# Patient Record
Sex: FEMALE | Race: OTHER | HISPANIC OR LATINO
[De-identification: names, ages, dates, MRNs, and addresses within clinical notes are randomized per-mention and may not be internally consistent; named-entity substitution may affect disease eponyms.]

---

## 2018-12-21 ENCOUNTER — TRANSCRIPTION ENCOUNTER (OUTPATIENT)
Age: 43
End: 2018-12-21

## 2019-07-19 ENCOUNTER — EMERGENCY (EMERGENCY)
Facility: HOSPITAL | Age: 44
LOS: 0 days | Discharge: HOME | End: 2019-07-19
Attending: EMERGENCY MEDICINE | Admitting: EMERGENCY MEDICINE
Payer: COMMERCIAL

## 2019-07-19 VITALS
SYSTOLIC BLOOD PRESSURE: 117 MMHG | DIASTOLIC BLOOD PRESSURE: 91 MMHG | HEART RATE: 70 BPM | RESPIRATION RATE: 18 BRPM | TEMPERATURE: 99 F | OXYGEN SATURATION: 100 %

## 2019-07-19 VITALS — WEIGHT: 145.06 LBS | HEIGHT: 64 IN

## 2019-07-19 DIAGNOSIS — R07.9 CHEST PAIN, UNSPECIFIED: ICD-10-CM

## 2019-07-19 DIAGNOSIS — R10.9 UNSPECIFIED ABDOMINAL PAIN: ICD-10-CM

## 2019-07-19 DIAGNOSIS — K29.60 OTHER GASTRITIS WITHOUT BLEEDING: ICD-10-CM

## 2019-07-19 LAB
ALBUMIN SERPL ELPH-MCNC: 3.7 G/DL — SIGNIFICANT CHANGE UP (ref 3.3–5)
ALP SERPL-CCNC: 75 U/L — SIGNIFICANT CHANGE UP (ref 40–120)
ALT FLD-CCNC: 24 U/L — SIGNIFICANT CHANGE UP (ref 12–78)
ANION GAP SERPL CALC-SCNC: 7 MMOL/L — SIGNIFICANT CHANGE UP (ref 5–17)
AST SERPL-CCNC: 8 U/L — LOW (ref 15–37)
BASOPHILS # BLD AUTO: 0.04 K/UL — SIGNIFICANT CHANGE UP (ref 0–0.2)
BASOPHILS NFR BLD AUTO: 0.5 % — SIGNIFICANT CHANGE UP (ref 0–2)
BILIRUB SERPL-MCNC: 0.4 MG/DL — SIGNIFICANT CHANGE UP (ref 0.2–1.2)
BUN SERPL-MCNC: 8 MG/DL — SIGNIFICANT CHANGE UP (ref 7–23)
CALCIUM SERPL-MCNC: 8.9 MG/DL — SIGNIFICANT CHANGE UP (ref 8.5–10.1)
CHLORIDE SERPL-SCNC: 108 MMOL/L — SIGNIFICANT CHANGE UP (ref 96–108)
CO2 SERPL-SCNC: 25 MMOL/L — SIGNIFICANT CHANGE UP (ref 22–31)
CREAT SERPL-MCNC: 0.75 MG/DL — SIGNIFICANT CHANGE UP (ref 0.5–1.3)
EOSINOPHIL # BLD AUTO: 0.12 K/UL — SIGNIFICANT CHANGE UP (ref 0–0.5)
EOSINOPHIL NFR BLD AUTO: 1.4 % — SIGNIFICANT CHANGE UP (ref 0–6)
GLUCOSE SERPL-MCNC: 87 MG/DL — SIGNIFICANT CHANGE UP (ref 70–99)
HCT VFR BLD CALC: 43.9 % — SIGNIFICANT CHANGE UP (ref 34.5–45)
HGB BLD-MCNC: 14.4 G/DL — SIGNIFICANT CHANGE UP (ref 11.5–15.5)
IMM GRANULOCYTES NFR BLD AUTO: 0.2 % — SIGNIFICANT CHANGE UP (ref 0–1.5)
LIDOCAIN IGE QN: 135 U/L — SIGNIFICANT CHANGE UP (ref 73–393)
LYMPHOCYTES # BLD AUTO: 1.86 K/UL — SIGNIFICANT CHANGE UP (ref 1–3.3)
LYMPHOCYTES # BLD AUTO: 21.4 % — SIGNIFICANT CHANGE UP (ref 13–44)
MCHC RBC-ENTMCNC: 28.9 PG — SIGNIFICANT CHANGE UP (ref 27–34)
MCHC RBC-ENTMCNC: 32.8 GM/DL — SIGNIFICANT CHANGE UP (ref 32–36)
MCV RBC AUTO: 88 FL — SIGNIFICANT CHANGE UP (ref 80–100)
MONOCYTES # BLD AUTO: 0.54 K/UL — SIGNIFICANT CHANGE UP (ref 0–0.9)
MONOCYTES NFR BLD AUTO: 6.2 % — SIGNIFICANT CHANGE UP (ref 2–14)
NEUTROPHILS # BLD AUTO: 6.1 K/UL — SIGNIFICANT CHANGE UP (ref 1.8–7.4)
NEUTROPHILS NFR BLD AUTO: 70.3 % — SIGNIFICANT CHANGE UP (ref 43–77)
PLATELET # BLD AUTO: 256 K/UL — SIGNIFICANT CHANGE UP (ref 150–400)
POTASSIUM SERPL-MCNC: 3.7 MMOL/L — SIGNIFICANT CHANGE UP (ref 3.5–5.3)
POTASSIUM SERPL-SCNC: 3.7 MMOL/L — SIGNIFICANT CHANGE UP (ref 3.5–5.3)
PROT SERPL-MCNC: 7.9 GM/DL — SIGNIFICANT CHANGE UP (ref 6–8.3)
RBC # BLD: 4.99 M/UL — SIGNIFICANT CHANGE UP (ref 3.8–5.2)
RBC # FLD: 12.5 % — SIGNIFICANT CHANGE UP (ref 10.3–14.5)
SODIUM SERPL-SCNC: 140 MMOL/L — SIGNIFICANT CHANGE UP (ref 135–145)
WBC # BLD: 8.68 K/UL — SIGNIFICANT CHANGE UP (ref 3.8–10.5)
WBC # FLD AUTO: 8.68 K/UL — SIGNIFICANT CHANGE UP (ref 3.8–10.5)

## 2019-07-19 PROCEDURE — 99284 EMERGENCY DEPT VISIT MOD MDM: CPT

## 2019-07-19 PROCEDURE — 93010 ELECTROCARDIOGRAM REPORT: CPT

## 2019-07-19 RX ORDER — LIDOCAINE 4 G/100G
5 CREAM TOPICAL ONCE
Refills: 0 | Status: COMPLETED | OUTPATIENT
Start: 2019-07-19 | End: 2019-07-19

## 2019-07-19 RX ORDER — FAMOTIDINE 10 MG/ML
20 INJECTION INTRAVENOUS ONCE
Refills: 0 | Status: COMPLETED | OUTPATIENT
Start: 2019-07-19 | End: 2019-07-19

## 2019-07-19 RX ADMIN — FAMOTIDINE 20 MILLIGRAM(S): 10 INJECTION INTRAVENOUS at 14:59

## 2019-07-19 RX ADMIN — LIDOCAINE 5 MILLILITER(S): 4 CREAM TOPICAL at 14:59

## 2019-07-19 RX ADMIN — Medication 30 MILLILITER(S): at 14:59

## 2019-07-19 NOTE — ED ADULT NURSE NOTE - NSIMPLEMENTINTERV_GEN_ALL_ED
Implemented All Universal Safety Interventions:  [place] to call system. Call bell, personal items and telephone within reach. Instruct patient to call for assistance. Room bathroom lighting operational. Non-slip footwear when patient is off stretcher. Physically safe environment: no spills, clutter or unnecessary equipment. Stretcher in lowest position, wheels locked, appropriate side rails in place.

## 2019-07-19 NOTE — ED STATDOCS - OBJECTIVE STATEMENT
43 y/o F with PMHx of colitis presenting to the ED c/o CP with radiation with radiation down R arm beginning 3 days ago. Pt states symptoms have been worsening today prompting ED visit. Pt also notes a burning sensation in her abd. Pt saw MD for stomach pain and was placed on rx with little to no relief. Denies fever, chills, SOB, or N/V/D. PMD: Dr. Ricketts.  #: [id number]. NKDA.

## 2019-07-19 NOTE — ED ADULT NURSE NOTE - OBJECTIVE STATEMENT
Patient complaining of epigastric discomfort. patient reports feeling a sour sensation up the throat.

## 2019-07-19 NOTE — ED STATDOCS - CLINICAL SUMMARY MEDICAL DECISION MAKING FREE TEXT BOX
Labs unremarkable.  Pt. had H Pylori test from PMD and will follow for GI referral.  all questions answered.  Pt. provided aftercare instructions.  copies of labs provided.   # [id number]  Florence Gibson PA-C

## 2019-07-21 LAB — H PYLORI AB SER-ACNC: 19.9 UNITS — SIGNIFICANT CHANGE UP

## 2019-08-01 ENCOUNTER — EMERGENCY (EMERGENCY)
Facility: HOSPITAL | Age: 44
LOS: 0 days | Discharge: HOME | End: 2019-08-01
Attending: EMERGENCY MEDICINE | Admitting: EMERGENCY MEDICINE
Payer: COMMERCIAL

## 2019-08-01 VITALS
OXYGEN SATURATION: 100 % | DIASTOLIC BLOOD PRESSURE: 84 MMHG | TEMPERATURE: 98 F | RESPIRATION RATE: 17 BRPM | SYSTOLIC BLOOD PRESSURE: 112 MMHG | HEART RATE: 67 BPM

## 2019-08-01 VITALS — WEIGHT: 119.93 LBS | HEIGHT: 63 IN

## 2019-08-01 DIAGNOSIS — R10.13 EPIGASTRIC PAIN: ICD-10-CM

## 2019-08-01 DIAGNOSIS — K29.70 GASTRITIS, UNSPECIFIED, WITHOUT BLEEDING: ICD-10-CM

## 2019-08-01 LAB
ALBUMIN SERPL ELPH-MCNC: 3.9 G/DL — SIGNIFICANT CHANGE UP (ref 3.3–5)
ALP SERPL-CCNC: 70 U/L — SIGNIFICANT CHANGE UP (ref 40–120)
ALT FLD-CCNC: 25 U/L — SIGNIFICANT CHANGE UP (ref 12–78)
ANION GAP SERPL CALC-SCNC: 7 MMOL/L — SIGNIFICANT CHANGE UP (ref 5–17)
APPEARANCE UR: CLEAR — SIGNIFICANT CHANGE UP
AST SERPL-CCNC: 13 U/L — LOW (ref 15–37)
BASOPHILS # BLD AUTO: 0.04 K/UL — SIGNIFICANT CHANGE UP (ref 0–0.2)
BASOPHILS NFR BLD AUTO: 0.5 % — SIGNIFICANT CHANGE UP (ref 0–2)
BILIRUB SERPL-MCNC: 0.3 MG/DL — SIGNIFICANT CHANGE UP (ref 0.2–1.2)
BILIRUB UR-MCNC: NEGATIVE — SIGNIFICANT CHANGE UP
BUN SERPL-MCNC: 7 MG/DL — SIGNIFICANT CHANGE UP (ref 7–23)
CALCIUM SERPL-MCNC: 8.6 MG/DL — SIGNIFICANT CHANGE UP (ref 8.5–10.1)
CHLORIDE SERPL-SCNC: 111 MMOL/L — HIGH (ref 96–108)
CO2 SERPL-SCNC: 24 MMOL/L — SIGNIFICANT CHANGE UP (ref 22–31)
COLOR SPEC: YELLOW — SIGNIFICANT CHANGE UP
CREAT SERPL-MCNC: 0.76 MG/DL — SIGNIFICANT CHANGE UP (ref 0.5–1.3)
DIFF PNL FLD: ABNORMAL
EOSINOPHIL # BLD AUTO: 0.04 K/UL — SIGNIFICANT CHANGE UP (ref 0–0.5)
EOSINOPHIL NFR BLD AUTO: 0.5 % — SIGNIFICANT CHANGE UP (ref 0–6)
GLUCOSE SERPL-MCNC: 91 MG/DL — SIGNIFICANT CHANGE UP (ref 70–99)
GLUCOSE UR QL: NEGATIVE MG/DL — SIGNIFICANT CHANGE UP
HCT VFR BLD CALC: 42.4 % — SIGNIFICANT CHANGE UP (ref 34.5–45)
HGB BLD-MCNC: 14 G/DL — SIGNIFICANT CHANGE UP (ref 11.5–15.5)
IMM GRANULOCYTES NFR BLD AUTO: 0.3 % — SIGNIFICANT CHANGE UP (ref 0–1.5)
KETONES UR-MCNC: ABNORMAL
LEUKOCYTE ESTERASE UR-ACNC: ABNORMAL
LIDOCAIN IGE QN: 80 U/L — SIGNIFICANT CHANGE UP (ref 73–393)
LYMPHOCYTES # BLD AUTO: 1.56 K/UL — SIGNIFICANT CHANGE UP (ref 1–3.3)
LYMPHOCYTES # BLD AUTO: 19.5 % — SIGNIFICANT CHANGE UP (ref 13–44)
MCHC RBC-ENTMCNC: 28.9 PG — SIGNIFICANT CHANGE UP (ref 27–34)
MCHC RBC-ENTMCNC: 33 GM/DL — SIGNIFICANT CHANGE UP (ref 32–36)
MCV RBC AUTO: 87.4 FL — SIGNIFICANT CHANGE UP (ref 80–100)
MONOCYTES # BLD AUTO: 0.28 K/UL — SIGNIFICANT CHANGE UP (ref 0–0.9)
MONOCYTES NFR BLD AUTO: 3.5 % — SIGNIFICANT CHANGE UP (ref 2–14)
NEUTROPHILS # BLD AUTO: 6.06 K/UL — SIGNIFICANT CHANGE UP (ref 1.8–7.4)
NEUTROPHILS NFR BLD AUTO: 75.7 % — SIGNIFICANT CHANGE UP (ref 43–77)
NITRITE UR-MCNC: NEGATIVE — SIGNIFICANT CHANGE UP
PH UR: 5 — SIGNIFICANT CHANGE UP (ref 5–8)
PLATELET # BLD AUTO: 257 K/UL — SIGNIFICANT CHANGE UP (ref 150–400)
POTASSIUM SERPL-MCNC: 3.7 MMOL/L — SIGNIFICANT CHANGE UP (ref 3.5–5.3)
POTASSIUM SERPL-SCNC: 3.7 MMOL/L — SIGNIFICANT CHANGE UP (ref 3.5–5.3)
PROT SERPL-MCNC: 7.6 GM/DL — SIGNIFICANT CHANGE UP (ref 6–8.3)
PROT UR-MCNC: 15 MG/DL
RBC # BLD: 4.85 M/UL — SIGNIFICANT CHANGE UP (ref 3.8–5.2)
RBC # FLD: 12.3 % — SIGNIFICANT CHANGE UP (ref 10.3–14.5)
SODIUM SERPL-SCNC: 142 MMOL/L — SIGNIFICANT CHANGE UP (ref 135–145)
SP GR SPEC: 1.02 — SIGNIFICANT CHANGE UP (ref 1.01–1.02)
UROBILINOGEN FLD QL: NEGATIVE MG/DL — SIGNIFICANT CHANGE UP
WBC # BLD: 8 K/UL — SIGNIFICANT CHANGE UP (ref 3.8–10.5)
WBC # FLD AUTO: 8 K/UL — SIGNIFICANT CHANGE UP (ref 3.8–10.5)

## 2019-08-01 PROCEDURE — 93010 ELECTROCARDIOGRAM REPORT: CPT

## 2019-08-01 PROCEDURE — 99284 EMERGENCY DEPT VISIT MOD MDM: CPT

## 2019-08-01 RX ORDER — SUCRALFATE 1 G
10 TABLET ORAL
Qty: 200 | Refills: 0
Start: 2019-08-01

## 2019-08-01 RX ORDER — FAMOTIDINE 10 MG/ML
20 INJECTION INTRAVENOUS ONCE
Refills: 0 | Status: COMPLETED | OUTPATIENT
Start: 2019-08-01 | End: 2019-08-01

## 2019-08-01 RX ORDER — ONDANSETRON 8 MG/1
1 TABLET, FILM COATED ORAL
Qty: 10 | Refills: 0
Start: 2019-08-01

## 2019-08-01 RX ORDER — SUCRALFATE 1 G
1 TABLET ORAL ONCE
Refills: 0 | Status: COMPLETED | OUTPATIENT
Start: 2019-08-01 | End: 2019-08-01

## 2019-08-01 RX ADMIN — Medication 1 GRAM(S): at 12:39

## 2019-08-01 RX ADMIN — FAMOTIDINE 20 MILLIGRAM(S): 10 INJECTION INTRAVENOUS at 12:39

## 2019-08-01 NOTE — ED ADULT TRIAGE NOTE - CHIEF COMPLAINT QUOTE
pt c/o epigastric burning radiating to throat  and nausea x 3 months , pt awaiting Dr. Khan( GI) for consultation for endoscopy , MD is on vacation , Pt reports 15 lbs weight loss in 2 weeks , pt takes omeprazole and pepcid with no relief

## 2019-08-01 NOTE — ED ADULT NURSE NOTE - OBJECTIVE STATEMENT
pt was experiencing nausea and abd discomfort. currently does not have either symptom. resting comfortably at this time

## 2019-08-01 NOTE — ED STATDOCS - CLINICAL SUMMARY MEDICAL DECISION MAKING FREE TEXT BOX
most likely GI complaint has f/u with GI started on proton pump inhibitor will get labs and f/u with GI.

## 2019-08-01 NOTE — ED STATDOCS - PROGRESS NOTE DETAILS
signed Patricia Chisholm PA-C Pt seen initially in intake by Dr. Alonso   44F c/o epigastric pain, burning x 3 mos. Pt has appt with GI Dr Khan in 1 month for scope. Pt notes it has been difficult to eat and she has been losing weight. pt alert, NAD. Plan labs, pepcid, carafate. Pt agrees with plan of  care. signed Patricia Chisholm PA-C  ID [id number] signed Patricia Chisholm PA-C  ID [id number]  No significant findings on labwork. Mod ketones in ED. Pt feeling better after meds. Likely gastritis. Will give other GI names for closer f/u. Will send rx for carafate and zofran. Pt given copy of lab results. Pt feeling well at DC, agrees with DC and plan of care.

## 2019-08-01 NOTE — ED STATDOCS - OBJECTIVE STATEMENT
45 y/o female with a PMHx of Colitis, presents to the ED c/o epigastric burning radiating to throat and nausea x3 months. +vomiting and loss of appetite. States it is difficult to swallow. Is currently being treated for GERD. Pt is awaiting Dr. Khan for consultation for endoscopy. States she takes Omeprazole and Pepcid with no relief.  PCP: Dr. Ricketts. GI: Dr. Khan. 45 y/o female with a PMHx of Colitis, presents to the ED c/o epigastric burning radiating to throat and nausea x3 months. +vomiting. States it is difficult to swallow. Is currently being treated for GERD. Pt is awaiting Dr. Khan for consultation for endoscopy. States she takes Omeprazole and Pepcid with no relief.  PCP: Dr. Ricketts. GI: Dr. Khan.

## 2019-08-01 NOTE — ED ADULT NURSE NOTE - NSIMPLEMENTINTERV_GEN_ALL_ED
Implemented All Universal Safety Interventions:  [place] to call system. Call bell, personal items and telephone within reach. Instruct patient to call for assistance. Room bathroom lighting operational. Non-slip footwear when patient is off stretcher. Physically safe environment: no spills, clutter or unnecessary equipment. Stretcher in lowest position, wheels locked, appropriate side rails in place.

## 2019-08-08 ENCOUNTER — OUTPATIENT (OUTPATIENT)
Dept: OUTPATIENT SERVICES | Facility: HOSPITAL | Age: 44
LOS: 1 days | Discharge: HOME | End: 2019-08-08
Payer: COMMERCIAL

## 2019-08-08 VITALS
RESPIRATION RATE: 20 BRPM | HEART RATE: 70 BPM | DIASTOLIC BLOOD PRESSURE: 81 MMHG | HEIGHT: 61 IN | TEMPERATURE: 97 F | WEIGHT: 140.65 LBS | SYSTOLIC BLOOD PRESSURE: 113 MMHG | OXYGEN SATURATION: 100 %

## 2019-08-08 DIAGNOSIS — R10.13 EPIGASTRIC PAIN: ICD-10-CM

## 2019-08-08 DIAGNOSIS — Z98.891 HISTORY OF UTERINE SCAR FROM PREVIOUS SURGERY: Chronic | ICD-10-CM

## 2019-08-08 LAB — HCG UR QL: NEGATIVE — SIGNIFICANT CHANGE UP

## 2019-08-08 PROCEDURE — 88305 TISSUE EXAM BY PATHOLOGIST: CPT

## 2019-08-08 PROCEDURE — 88312 SPECIAL STAINS GROUP 1: CPT | Mod: 26

## 2019-08-08 PROCEDURE — 88313 SPECIAL STAINS GROUP 2: CPT | Mod: 26

## 2019-08-08 PROCEDURE — 88312 SPECIAL STAINS GROUP 1: CPT

## 2019-08-08 PROCEDURE — 88305 TISSUE EXAM BY PATHOLOGIST: CPT | Mod: 26

## 2019-08-08 PROCEDURE — 81025 URINE PREGNANCY TEST: CPT

## 2019-08-08 PROCEDURE — 88313 SPECIAL STAINS GROUP 2: CPT

## 2019-08-14 DIAGNOSIS — K29.00 ACUTE GASTRITIS WITHOUT BLEEDING: ICD-10-CM

## 2019-08-14 DIAGNOSIS — K21.9 GASTRO-ESOPHAGEAL REFLUX DISEASE WITHOUT ESOPHAGITIS: ICD-10-CM

## 2019-08-14 DIAGNOSIS — R13.10 DYSPHAGIA, UNSPECIFIED: ICD-10-CM

## 2020-05-08 NOTE — ED STATDOCS - DATE/TIME 1
Consent: The patient's consent was obtained including but not limited to risks of crusting, scabbing, blistering, scarring, darker or lighter pigmentary change, recurrence, incomplete removal and infection. Pared With?: 15 blade Add 52 Modifier (Optional): no Duration Of Freeze Thaw-Cycle (Seconds): 5 Medical Necessity Information: It is in your best interest to select a reason for this procedure from the list below. All of these items fulfill various CMS LCD requirements except the new and changing color options. Detail Level: Detailed Number Of Freeze-Thaw Cycles: 1 freeze-thaw cycle Include Z78.9 (Other Specified Conditions Influencing Health Status) As An Associated Diagnosis?: Yes Post-Care Instructions: I reviewed with the patient in detail post-care instructions. Patient is to wear sunprotection, and avoid picking at any of the treated lesions. Pt may apply Vaseline to crusted or scabbing areas. Post-Care Instructions: I reviewed with the patient in detail post-care instructions. Patient is to wear sunprotection, and avoid picking at any of the treated lesions. Pt may apply polysporin to crusted or scabbing areas. Number Of Freeze-Thaw Cycles: 2 freeze-thaw cycles Duration Of Freeze Thaw-Cycle (Seconds): 5-10 01-Aug-2019 12:33

## 2020-10-29 ENCOUNTER — TRANSCRIPTION ENCOUNTER (OUTPATIENT)
Age: 45
End: 2020-10-29

## 2021-09-01 NOTE — ASU PATIENT PROFILE, ADULT - NS PRO PT RIGHT SUPPORT PERSON
Received a fax from [employer]  Needed a Medicare Detailed Written Order filled out  Filled out and faxed back  Declines

## 2022-01-09 ENCOUNTER — TRANSCRIPTION ENCOUNTER (OUTPATIENT)
Age: 47
End: 2022-01-09

## 2022-05-28 NOTE — ED STATDOCS - ENMT, MLM
Nasal mucosa clear.  Mouth with normal mucosa  Throat has no vesicles, no oropharyngeal exudates and uvula is midline. [[id number]]

## 2023-05-01 ENCOUNTER — NON-APPOINTMENT (OUTPATIENT)
Age: 48
End: 2023-05-01

## 2023-06-28 ENCOUNTER — NON-APPOINTMENT (OUTPATIENT)
Age: 48
End: 2023-06-28

## 2023-06-28 PROBLEM — Z78.9 OTHER SPECIFIED HEALTH STATUS: Chronic | Status: ACTIVE | Noted: 2019-08-08

## 2023-07-10 ENCOUNTER — APPOINTMENT (OUTPATIENT)
Dept: ORTHOPEDIC SURGERY | Facility: CLINIC | Age: 48
End: 2023-07-10

## 2023-07-10 PROBLEM — Z00.00 ENCOUNTER FOR PREVENTIVE HEALTH EXAMINATION: Status: ACTIVE | Noted: 2023-07-10

## 2024-01-08 ENCOUNTER — TRANSCRIPTION ENCOUNTER (OUTPATIENT)
Age: 49
End: 2024-01-08

## 2024-01-08 ENCOUNTER — APPOINTMENT (OUTPATIENT)
Dept: ORTHOPEDIC SURGERY | Facility: CLINIC | Age: 49
End: 2024-01-08
Payer: COMMERCIAL

## 2024-01-08 VITALS
SYSTOLIC BLOOD PRESSURE: 147 MMHG | DIASTOLIC BLOOD PRESSURE: 90 MMHG | WEIGHT: 160 LBS | BODY MASS INDEX: 28.35 KG/M2 | HEIGHT: 63 IN

## 2024-01-08 PROCEDURE — 99204 OFFICE O/P NEW MOD 45 MIN: CPT

## 2024-01-08 PROCEDURE — 72110 X-RAY EXAM L-2 SPINE 4/>VWS: CPT

## 2024-01-08 RX ORDER — GABAPENTIN 300 MG/1
300 CAPSULE ORAL 3 TIMES DAILY
Qty: 90 | Refills: 1 | Status: ACTIVE | COMMUNITY
Start: 2024-01-08 | End: 1900-01-01

## 2024-01-08 RX ORDER — METHYLPREDNISOLONE 4 MG/1
4 TABLET ORAL
Qty: 1 | Refills: 0 | Status: ACTIVE | COMMUNITY
Start: 2024-01-08 | End: 1900-01-01

## 2024-02-05 ENCOUNTER — APPOINTMENT (OUTPATIENT)
Dept: ORTHOPEDIC SURGERY | Facility: CLINIC | Age: 49
End: 2024-02-05
Payer: COMMERCIAL

## 2024-02-05 VITALS — BODY MASS INDEX: 28.35 KG/M2 | WEIGHT: 160 LBS | HEIGHT: 63 IN

## 2024-02-05 PROCEDURE — 99214 OFFICE O/P EST MOD 30 MIN: CPT

## 2024-02-05 NOTE — DISCUSSION/SUMMARY
[de-identified] : MRI lumbar spine to evaluate for a left S1 radiculopathy She will continue her gabapentin 300 mg 3 times a day We will try diclofenac 75 mg twice daily with food for pain control I will see her back after the MRI to go over the results and possibly recommend injections moving forward.

## 2024-02-05 NOTE — HISTORY OF PRESENT ILLNESS
[de-identified] : Chief Complaint: Left leg pain   History of Present Illness: 48-year-old female presenting today for a 1 month follow-up visit, [place]inamese-speaking,  was used throughout the entirety of her exam today.  During her last visit she was diagnosed with a left S1 radiculopathy she has been suffering with this for about 2 to 3 months.  We increased her gabapentin, started a Medrol Dosepak which she states gave her significant relief while on the Medrol Dosepak but all the pain came back.  She states the pain today is a 10 out of 10 in severity causing her significant discomfort decreased quality of life and inability to perform her activities of daily living such as working.  She was taking Celebrex twice a day and states that this also did not help with her symptoms.  She has severe left leg pain she denies any weakness she denies any numbness or tingling.  She is overall unhappy with her current management unhappy with her current level of pain control and would like to consider other options moving forward.   Past medical history, past surgical history, medications, allergies, social history, and family history are as documented in our records today.  Notable items include: None   Review of Systems: I have reviewed the patient's documented Review of Systems data today, I concur with this documentation.

## 2024-02-05 NOTE — REASON FOR VISIT
[Follow-Up Visit] : a follow-up visit for [Back Pain] : back pain [[employer] ] : provided by [employer]   [Interpreters_IDNumber] : [id number] [Interpreters_FullName] : Courtney [TWNoteComboBox1] : [place]araguan

## 2024-02-05 NOTE — ASSESSMENT
[FreeTextEntry1] : The patient and I discussed the use of non-steroidal anti-inflammatory drugs (NSAIDs) today.  The patient understands that there are both over the counter (OTC) and prescribed medications in this drug class that may be used in the treatment of spinal conditions.  The benefits of this class of medications may include: diminished pain, increased function, and a diminished use of other classes of medications.  Use of medications in this class may also have risks.  These risks include, but are not limited to: kidney damage, gastro-intestinal/stomach issues, cardiovascular issues, reactions to the medication (allergy/intolerance), bleeding concerns, interactions with other medication, and other issues.  Today, I have prescribed diclofenac 75 mg twice daily. The patient was counseled to contact us if concerns arise as the result of this medication.  The patient was also counseled to stop the medication if these concerns arise.

## 2024-02-09 ENCOUNTER — OUTPATIENT (OUTPATIENT)
Dept: OUTPATIENT SERVICES | Facility: HOSPITAL | Age: 49
LOS: 1 days | End: 2024-02-09
Payer: COMMERCIAL

## 2024-02-09 ENCOUNTER — APPOINTMENT (OUTPATIENT)
Dept: MRI IMAGING | Facility: CLINIC | Age: 49
End: 2024-02-09
Payer: COMMERCIAL

## 2024-02-09 DIAGNOSIS — M54.16 RADICULOPATHY, LUMBAR REGION: ICD-10-CM

## 2024-02-09 DIAGNOSIS — Z98.891 HISTORY OF UTERINE SCAR FROM PREVIOUS SURGERY: Chronic | ICD-10-CM

## 2024-02-09 PROCEDURE — 72148 MRI LUMBAR SPINE W/O DYE: CPT

## 2024-02-09 PROCEDURE — 72148 MRI LUMBAR SPINE W/O DYE: CPT | Mod: 26

## 2024-03-03 ENCOUNTER — RX RENEWAL (OUTPATIENT)
Age: 49
End: 2024-03-03

## 2024-03-03 RX ORDER — DICLOFENAC SODIUM 75 MG/1
75 TABLET, DELAYED RELEASE ORAL
Qty: 60 | Refills: 0 | Status: ACTIVE | COMMUNITY
Start: 2024-02-05 | End: 1900-01-01

## 2024-03-04 ENCOUNTER — APPOINTMENT (OUTPATIENT)
Dept: ORTHOPEDIC SURGERY | Facility: CLINIC | Age: 49
End: 2024-03-04
Payer: COMMERCIAL

## 2024-03-11 ENCOUNTER — APPOINTMENT (OUTPATIENT)
Dept: ORTHOPEDIC SURGERY | Facility: CLINIC | Age: 49
End: 2024-03-11
Payer: COMMERCIAL

## 2024-03-11 VITALS
HEIGHT: 63 IN | SYSTOLIC BLOOD PRESSURE: 144 MMHG | HEART RATE: 79 BPM | DIASTOLIC BLOOD PRESSURE: 89 MMHG | BODY MASS INDEX: 28.35 KG/M2 | WEIGHT: 160 LBS

## 2024-03-11 DIAGNOSIS — M54.16 RADICULOPATHY, LUMBAR REGION: ICD-10-CM

## 2024-03-11 PROCEDURE — 99214 OFFICE O/P EST MOD 30 MIN: CPT

## 2024-03-11 NOTE — PHYSICAL EXAM
[de-identified] : MRI lumbar spine axial and sagittal T1 and T2 weighted images and [facility] PACS demonstrates mild degenerative disc disease broad-based disc bulge L4-5 and L5-S1 no signs of significant disc herniation or central or lateral recess or foraminal stenosis throughout the lumbar spine.

## 2024-03-11 NOTE — HISTORY OF PRESENT ILLNESS
[de-identified] : Chief complaint is low back and left leg pain  48-year-old female presenting today for review of her lumbar MRI.  I have been treating Jimena now for several months for her severe low back and left leg pain.  She has not responded to physical therapy, medications and watchful waiting therefore a lumbar MRI was ordered to rule out a lumbar radiculopathy.  She states that she still has severe left leg pain and low back pain and S1 distribution.

## 2024-03-11 NOTE — REASON FOR VISIT
[Follow-Up Visit] : a follow-up visit for [Back Pain] : back pain [[employer] ] : provided by [employer]   [Interpreters_IDNumber] : [id number] [Interpreters_FullName] : Doreen [TWNoteComboBox1] : Afghan

## 2024-03-11 NOTE — DISCUSSION/SUMMARY
[de-identified] : She can discontinue her gabapentin I discussed with that her MRI does not demonstrate why she would be suffering from left lower extremity radicular symptoms and I recommended that she follow-up with her primary care doctor for continued workup as her left leg symptoms are not coming from her lumbar spine. She will follow-up with me on an as-needed basis or she has any new symptoms in the future

## 2024-06-19 ENCOUNTER — EMERGENCY (EMERGENCY)
Facility: HOSPITAL | Age: 49
LOS: 0 days | Discharge: HOME | End: 2024-06-19
Attending: STUDENT IN AN ORGANIZED HEALTH CARE EDUCATION/TRAINING PROGRAM
Payer: COMMERCIAL

## 2024-06-19 VITALS
TEMPERATURE: 98 F | HEART RATE: 73 BPM | SYSTOLIC BLOOD PRESSURE: 125 MMHG | RESPIRATION RATE: 18 BRPM | DIASTOLIC BLOOD PRESSURE: 83 MMHG | OXYGEN SATURATION: 99 %

## 2024-06-19 VITALS
OXYGEN SATURATION: 97 % | WEIGHT: 167.99 LBS | SYSTOLIC BLOOD PRESSURE: 148 MMHG | RESPIRATION RATE: 18 BRPM | DIASTOLIC BLOOD PRESSURE: 91 MMHG | HEART RATE: 81 BPM | TEMPERATURE: 99 F

## 2024-06-19 DIAGNOSIS — Z98.891 HISTORY OF UTERINE SCAR FROM PREVIOUS SURGERY: Chronic | ICD-10-CM

## 2024-06-19 PROCEDURE — 99284 EMERGENCY DEPT VISIT MOD MDM: CPT

## 2024-06-19 PROCEDURE — 73630 X-RAY EXAM OF FOOT: CPT | Mod: 26,RT

## 2024-06-19 PROCEDURE — 99283 EMERGENCY DEPT VISIT LOW MDM: CPT | Mod: 25

## 2024-06-19 PROCEDURE — 73630 X-RAY EXAM OF FOOT: CPT | Mod: RT

## 2024-06-19 RX ORDER — IBUPROFEN 200 MG
600 TABLET ORAL ONCE
Refills: 0 | Status: COMPLETED | OUTPATIENT
Start: 2024-06-19 | End: 2024-06-19

## 2024-06-19 RX ADMIN — Medication 600 MILLIGRAM(S): at 22:58

## 2024-06-19 NOTE — ED STATDOCS - OBJECTIVE STATEMENT
50 y/o female presents to the ED c/o atraumatic right foot pain, had injection for it yesterday however reported worsening pain. Pt reports chronic with exacerbation

## 2024-06-19 NOTE — ED STATDOCS - PATIENT PORTAL LINK FT
You can access the FollowMyHealth Patient Portal offered by [facility] by registering at the following website: [email or web address]. By joining [facility]’s FollowMyHealth portal, you will also be able to view your health information using other applications (apps) compatible with our system.

## 2024-06-19 NOTE — ED STATDOCS - PHYSICAL EXAMINATION
Refill Routing Note    Medication(s) are appropriate for refill Outside of protocol      Requested Prescriptions   Pending Prescriptions Disp Refills    traMADol (ULTRAM) 50 mg tablet [Pharmacy Med Name: TRAMADOL 50MG TABLETS] 60 tablet 0     Sig: TAKE 1 TABLET BY MOUTH EVERY 12 HOURS AS NEEDED FOR PAIN       Narcotics Refill Protocol Passed - 11/26/2019 10:19 AM        Passed - Patient not pregnant        Passed - Patient seen within 3 months     Last visit with Sadaf Vargas MD: 11/25/2019  Last visit in [facility]: 11/25/2019    Patient's next visit in [facility]: Visit date not found           Passed - Med not refilled within 4 weeks              
Const: Well appearing, NAD  Eyes: PERRL, EOM intact  CV: RRR, no murmurs, no chest wall tenderness, distal pulses intact  Resp: CTAB, normal resp effort  GI: soft, nondistended, nontender  MSK: + right planter ttp, no redness, no edema, 2+ dp pulse  Neuro: AOx3, GCS 15, No focal deficits  Skin: No rash, laceration or abrasion  Psych: calm, cooperative, No SI, HI, hallucination.

## 2024-06-19 NOTE — ED ADULT NURSE NOTE - OBJECTIVE STATEMENT
The pt is a 48 y/o female presenting to the ED c/o foot pain. Pt reports that she is having difficulty walking.

## 2024-06-19 NOTE — ED STATDOCS - CLINICAL SUMMARY MEDICAL DECISION MAKING FREE TEXT BOX
pt with exacerbation of chronic right foot pain, no redness to suggest cellulitis, likely tendonitis or plantar fasciitis, will order XR r/o fracture, Motrin for pain. Pt educated on dosing for Tylenol and Motrin

## 2024-06-19 NOTE — ED STATDOCS - NSFOLLOWUPINSTRUCTIONS_ED_ALL_ED_FT
Fascitis plantar    LO QUE NECESITA SABER:    La fascitis plantar es la hinchazón de la fascia plantar. La fascia plantar es kristin crooks gruesa de tejido que conecta el hueso del talón a los dedos del pie. Esta parte del pie ayuda a brindar soporte al arco del pie y a amortiguar los golpes.  Anatomía del pie    INSTRUCCIONES SOBRE EL LOAN HOSPITALARIA:    Llame a long médico si:    El dolor o la hinchazón aumentan repentinamente.    Presenta dolor de rodilla, cadera o espalda.    Usted tiene preguntas o inquietudes acerca de long condición o cuidado.  Medicamentos:Es posible que usted necesite alguno de los siguientes:    Acetaminofénalivia el dolor y baja la fiebre. Está disponible sin receta médica. Pregunte la cantidad y la frecuencia con que debe tomarlos. Siga las indicaciones. Fannie las etiquetas de todos los demás medicamentos que esté usando para saber si también contienen acetaminofén, o pregunte a long médico o farmacéutico. El acetaminofén puede causar daño en el hígado cuando no se jordon de forma correcta.    AINEcomo el ibuprofeno, ayudan a disminuir la inflamación, el dolor y la fiebre. Los EVERARDO pueden causar sangrado estomacal o problemas renales en ciertas personas. Si usted jordon un medicamento anticoagulante, siempre pregúntele a long médico si los EVERARDO son seguros para usted. Siempre fannie la etiqueta de theodora medicamento y siga las instrucciones.    [place] jaison medicamentos enrique se le haya indicado.Consulte con long médico si usted dhruv que long medicamento no le está ayudando o si presenta efectos secundarios. Infórmele al médico si usted es alérgico a algún medicamento. Mantenga kristin lista actualizada de los medicamentos, las vitaminas y los productos herbales que jordon. Incluya los siguientes datos de los medicamentos: cantidad, frecuencia y motivo de administración. Traiga con usted la lista o los envases de las píldoras a jaison citas de seguimiento. Lleve la lista de los medicamentos con usted en dinora de kristin emergencia.  Cuidados personales:    Use la férula o las plantillas enrique se le indique.Es posible que usted necesite usar kristin férula por la noche para mantener el pie estirado mientras duerme. Corona ayudará a evitar el dolor shante temprano por la mañana. Las plantillas ayudarán a disminuir la tensión en la fascia plantar al caminar o hacer ejercicio.    Aplique hielo sobre la fascia plantar.El hielo ayuda a disminuir la inflamación y el dolor. Llene kristin botella de agua con agua y congélela. Envuelva kristin toalla alrededor de la botella o cúbrala con kristin evangelina de almohada. Ruede la botella de agua bajo el pie por 10 minutos cada mañana y cada noche.    Masaje de la fascia plantar enrique le indicaron.Corona podría ayudar a disminuir la hinchazón y el dolor. Ruede kristin pelota de golf debajo de los pies cyndi 10 minutos. Pipe esto 3 veces al día.    Vaya a terapia física según indicaciones.Un fisioterapeuta le puede enseñar ejercicios para ayudarle a mejorar el movimiento y la fuerza, y para disminuir el dolor.  Evite la fascitis plantar:    Mantenga un peso saludable.Corona ayudará a disminuir la tensión en los pies. Pregúntele a long médico cuál es el peso ideal para usted. Pídale que lo ayude a crear un plan para perder peso, si lo necesita.    Pipe ejercicios de bajo impacto.Los ejercicios de bajo impacto disminuyen la tensión en la fascia plantar. Por ejemplo, nadar o andar en bicicleta.    Comience nuevas actividades lentamente.Aumente gradualmente la intensidad y la duración.    Use calzado que le quede dashawn y que brinde soporte al arco.Reemplace el calzado antes de que la plantilla o el amortiguador de golpes se desgaste. Evite caminar o pararse descalzo o en sandalias por largos períodos de tiempo.  Acuda a la consulta de control con long médico según las indicaciones:Anote jaison preguntas para que se acuerde de hacerlas cyndi jaison visitas.

## 2024-06-19 NOTE — ED ADULT TRIAGE NOTE - CHIEF COMPLAINT QUOTE
Pt presents to ED c/o right foot pain. Pt reports pain started yesterday but has gotten worse today. Denies trauma or mechanism of injury, difficulty ambulating.

## 2024-06-29 ENCOUNTER — NON-APPOINTMENT (OUTPATIENT)
Age: 49
End: 2024-06-29

## 2025-04-27 ENCOUNTER — EMERGENCY (EMERGENCY)
Facility: HOSPITAL | Age: 50
LOS: 0 days | Discharge: HOME | End: 2025-04-27
Attending: EMERGENCY MEDICINE
Payer: COMMERCIAL

## 2025-04-27 ENCOUNTER — NON-APPOINTMENT (OUTPATIENT)
Age: 50
End: 2025-04-27

## 2025-04-27 VITALS
TEMPERATURE: 99 F | HEART RATE: 69 BPM | RESPIRATION RATE: 18 BRPM | SYSTOLIC BLOOD PRESSURE: 134 MMHG | OXYGEN SATURATION: 100 % | DIASTOLIC BLOOD PRESSURE: 86 MMHG

## 2025-04-27 VITALS
SYSTOLIC BLOOD PRESSURE: 149 MMHG | HEART RATE: 88 BPM | OXYGEN SATURATION: 99 % | WEIGHT: 166.67 LBS | TEMPERATURE: 99 F | DIASTOLIC BLOOD PRESSURE: 98 MMHG | RESPIRATION RATE: 18 BRPM

## 2025-04-27 DIAGNOSIS — Z98.891 HISTORY OF UTERINE SCAR FROM PREVIOUS SURGERY: Chronic | ICD-10-CM

## 2025-04-27 DIAGNOSIS — I82.402 ACUTE EMBOLISM AND THROMBOSIS OF UNSPECIFIED DEEP VEINS OF LEFT LOWER EXTREMITY: ICD-10-CM

## 2025-04-27 LAB
ALBUMIN SERPL ELPH-MCNC: 3.2 G/DL — LOW (ref 3.3–5)
ALP SERPL-CCNC: 58 U/L — SIGNIFICANT CHANGE UP (ref 40–120)
ALT FLD-CCNC: 22 U/L — SIGNIFICANT CHANGE UP (ref 12–78)
ANION GAP SERPL CALC-SCNC: 5 MMOL/L — SIGNIFICANT CHANGE UP (ref 5–17)
AST SERPL-CCNC: 6 U/L — LOW (ref 15–37)
BASOPHILS # BLD AUTO: 0.03 K/UL — SIGNIFICANT CHANGE UP (ref 0–0.2)
BASOPHILS NFR BLD AUTO: 0.4 % — SIGNIFICANT CHANGE UP (ref 0–2)
BILIRUB SERPL-MCNC: 0.3 MG/DL — SIGNIFICANT CHANGE UP (ref 0.2–1.2)
BUN SERPL-MCNC: 8 MG/DL — SIGNIFICANT CHANGE UP (ref 7–23)
CALCIUM SERPL-MCNC: 8.6 MG/DL — SIGNIFICANT CHANGE UP (ref 8.5–10.1)
CHLORIDE SERPL-SCNC: 110 MMOL/L — HIGH (ref 96–108)
CO2 SERPL-SCNC: 23 MMOL/L — SIGNIFICANT CHANGE UP (ref 22–31)
CREAT SERPL-MCNC: 0.5 MG/DL — SIGNIFICANT CHANGE UP (ref 0.5–1.3)
EGFR: 116 ML/MIN/1.73M2 — SIGNIFICANT CHANGE UP
EGFR: 116 ML/MIN/1.73M2 — SIGNIFICANT CHANGE UP
EOSINOPHIL # BLD AUTO: 0.06 K/UL — SIGNIFICANT CHANGE UP (ref 0–0.5)
EOSINOPHIL NFR BLD AUTO: 0.9 % — SIGNIFICANT CHANGE UP (ref 0–6)
GLUCOSE SERPL-MCNC: 87 MG/DL — SIGNIFICANT CHANGE UP (ref 70–99)
HCG SERPL-ACNC: <1 MIU/ML — SIGNIFICANT CHANGE UP
HCT VFR BLD CALC: 43.7 % — SIGNIFICANT CHANGE UP (ref 34.5–45)
HGB BLD-MCNC: 14.4 G/DL — SIGNIFICANT CHANGE UP (ref 11.5–15.5)
IMM GRANULOCYTES # BLD AUTO: 0.02 K/UL — SIGNIFICANT CHANGE UP (ref 0–0.07)
IMM GRANULOCYTES NFR BLD AUTO: 0.3 % — SIGNIFICANT CHANGE UP (ref 0–0.9)
LYMPHOCYTES # BLD AUTO: 2.23 K/UL — SIGNIFICANT CHANGE UP (ref 1–3.3)
LYMPHOCYTES NFR BLD AUTO: 32.7 % — SIGNIFICANT CHANGE UP (ref 13–44)
MCHC RBC-ENTMCNC: 29.7 PG — SIGNIFICANT CHANGE UP (ref 27–34)
MCHC RBC-ENTMCNC: 33 G/DL — SIGNIFICANT CHANGE UP (ref 32–36)
MCV RBC AUTO: 90.1 FL — SIGNIFICANT CHANGE UP (ref 80–100)
MONOCYTES # BLD AUTO: 0.36 K/UL — SIGNIFICANT CHANGE UP (ref 0–0.9)
MONOCYTES NFR BLD AUTO: 5.3 % — SIGNIFICANT CHANGE UP (ref 2–14)
NEUTROPHILS # BLD AUTO: 4.12 K/UL — SIGNIFICANT CHANGE UP (ref 1.8–7.4)
NEUTROPHILS NFR BLD AUTO: 60.4 % — SIGNIFICANT CHANGE UP (ref 43–77)
NRBC # BLD AUTO: 0 K/UL — SIGNIFICANT CHANGE UP (ref 0–0)
NRBC # FLD: 0 K/UL — SIGNIFICANT CHANGE UP (ref 0–0)
NRBC BLD AUTO-RTO: 0 /100 WBCS — SIGNIFICANT CHANGE UP (ref 0–0)
PLATELET # BLD AUTO: 236 K/UL — SIGNIFICANT CHANGE UP (ref 150–400)
PMV BLD: 9.4 FL — SIGNIFICANT CHANGE UP (ref 7–13)
POTASSIUM SERPL-MCNC: 4.1 MMOL/L — SIGNIFICANT CHANGE UP (ref 3.5–5.3)
POTASSIUM SERPL-SCNC: 4.1 MMOL/L — SIGNIFICANT CHANGE UP (ref 3.5–5.3)
PROT SERPL-MCNC: 7.2 GM/DL — SIGNIFICANT CHANGE UP (ref 6–8.3)
RBC # BLD: 4.85 M/UL — SIGNIFICANT CHANGE UP (ref 3.8–5.2)
RBC # FLD: 13.2 % — SIGNIFICANT CHANGE UP (ref 10.3–14.5)
SODIUM SERPL-SCNC: 138 MMOL/L — SIGNIFICANT CHANGE UP (ref 135–145)
WBC # BLD: 6.82 K/UL — SIGNIFICANT CHANGE UP (ref 3.8–10.5)
WBC # FLD AUTO: 6.82 K/UL — SIGNIFICANT CHANGE UP (ref 3.8–10.5)

## 2025-04-27 PROCEDURE — 36415 COLL VENOUS BLD VENIPUNCTURE: CPT

## 2025-04-27 PROCEDURE — 99284 EMERGENCY DEPT VISIT MOD MDM: CPT

## 2025-04-27 PROCEDURE — 80053 COMPREHEN METABOLIC PANEL: CPT

## 2025-04-27 PROCEDURE — 93971 EXTREMITY STUDY: CPT | Mod: 26,LT

## 2025-04-27 PROCEDURE — 99284 EMERGENCY DEPT VISIT MOD MDM: CPT | Mod: 25

## 2025-04-27 PROCEDURE — 93971 EXTREMITY STUDY: CPT | Mod: LT

## 2025-04-27 PROCEDURE — 84702 CHORIONIC GONADOTROPIN TEST: CPT

## 2025-04-27 PROCEDURE — 96374 THER/PROPH/DIAG INJ IV PUSH: CPT

## 2025-04-27 PROCEDURE — 85025 COMPLETE CBC W/AUTO DIFF WBC: CPT

## 2025-04-27 RX ORDER — KETOROLAC TROMETHAMINE 30 MG/ML
15 INJECTION, SOLUTION INTRAMUSCULAR; INTRAVENOUS ONCE
Refills: 0 | Status: DISCONTINUED | OUTPATIENT
Start: 2025-04-27 | End: 2025-04-27

## 2025-04-27 RX ORDER — RIVAROXABAN 10 MG/1
1 TABLET, FILM COATED ORAL
Qty: 42 | Refills: 0
Start: 2025-04-27 | End: 2025-05-17

## 2025-04-27 RX ORDER — RIVAROXABAN 10 MG/1
15 TABLET, FILM COATED ORAL ONCE
Refills: 0 | Status: COMPLETED | OUTPATIENT
Start: 2025-04-27 | End: 2025-04-27

## 2025-04-27 RX ADMIN — KETOROLAC TROMETHAMINE 15 MILLIGRAM(S): 30 INJECTION, SOLUTION INTRAMUSCULAR; INTRAVENOUS at 11:37

## 2025-04-27 RX ADMIN — RIVAROXABAN 15 MILLIGRAM(S): 10 TABLET, FILM COATED ORAL at 14:35

## 2025-04-27 NOTE — ED ADULT TRIAGE NOTE - CHIEF COMPLAINT QUOTE
PT presents to er sent in from urgent care for LLE pain, sharp, onset 3 days ago, denies injury, SOB.

## 2025-04-27 NOTE — ED ADULT NURSE NOTE - OBJECTIVE STATEMENT
Pt ambulatory to the ED with sent from  c/o LLE sharp pain onset x3 days ago. Pt denies injury or fall. Pt states the leg hurts on ambulation. Pt ambulatory to the ED with sent from  c/o LLE sharp pain onset x3 days ago. Pt denies injury or fall. Pt states the leg hurts on ambulation. PIV obtained, labs sent, pt to lamine

## 2025-04-27 NOTE — ED STATDOCS - PROGRESS NOTE DETAILS
patient seen and evaluated.   used to review results, +DVT.  Likely provoked by hormonal medications used to control her menopausal symptoms.  Given her pain and swelling, will treat with xarelto.  Denies CP, SOB.  Reviewed PE precautions and bleeding precautions as well as follow up with PMD, vascular and gyn for alternate menopausal symptom treatment. ER return precautions -John Kuo PA-C

## 2025-04-27 NOTE — ED STATDOCS - NSFOLLOWUPINSTRUCTIONS_ED_ALL_ED_FT
Trombosis venosa profunda  Deep Vein Thrombosis  A person's legs with close-ups showing a normal vein, a vein with a blood clot, and a blood clot that breaks loose.  La trombosis venosa profunda (TVP) es kristin afección en la que se forma un coágulo de macie en kristin vena del sistema venoso profundo. Bird-in-Hand puede ocurrir en la parte inferior de la pierna, el muslo, la pelvis, el brazo o el ting. Un coágulo es macie que se ha espesado y convertido en gel o se ha solidificado. Esta afección es grave y puede poner en peligro la german si el coágulo llega a las arterias de los pulmones y causa kristin obstrucción (embolia pulmonar). La TVP también puede dañar las venas de la pierna, y eso puede causar kristin enfermedad venosa a cyrus plazo, dolor en la pierna, hinchazón, cambio de color y úlceras o llagas (síndrome postrombótico).    ¿Cuáles son las causas?  Esta afección puede ser causada por lo siguiente:  Kristin ralentización de la circulación sanguínea.  Daño en kristin vena.  Kristin afección que hace que la macie se coagule más fácilmente, enrique ciertos trastornos hemorrágicos.  ¿Qué incrementa el riesgo?  Los siguientes factores pueden hacer que sea más propenso a desarrollar esta afección:  Obesidad.  Tener edad avanzada, en especial más de 60 años.  Ser inactivo o no moverse (estilo de german sedentario). Estas medidas pueden incluir:  Permanecer sentado o recostado cyndi más de 4 a 6 horas por otro motivo que no sea dormir por la noche.  Estar en el hospital o someterse a kristin cirugía mayor o prolongada.  Tener lesiones recientes en los huesos que reducen el movimiento, enrique roturas (fracturas), especialmente en las extremidades inferiores.  Haberse sometido a kristin cirugía ortopédica reciente en las extremidades inferiores.  Estar embarazada, rosetta a vee o bronson dado a vee recientemente.  Nayeli medicamentos que contienen estrógenos, enrique las píldoras anticonceptivas o la terapia de reemplazo hormonal.  Consumir productos que contengan nicotina o tabaco, especialmente si usa un método anticonceptivo hormonal.  Tener antecedentes de kristin enfermedad de los vasos sanguíneos (enfermedad vascular periférica) o enfermedad cardíaca congestiva.  Tener antecedentes de cáncer, especialmente si recibió tratamiento con quimioterapia.  ¿Cuáles son los signos o síntomas?  Los síntomas de esta afección incluyen:  Hinchazón, dolor, presión o sensibilidad en un brazo o kristin pierna.  Un brazo o kristin pierna se calienta, enrojece o cambia de color.  Kristin pierna se torna muy pálida o azulada. Puede tener kristin TVP willy. Bird-in-Hand es poco frecuente.  Si el coágulo está ubicado en la pierna, puede notar que los síntomas empeoran al pararse o caminar.    En algunos casos, no hay síntomas.    ¿Cómo se diagnostica?  Esta afección se diagnostica mediante:  Los antecedentes médicos y un examen físico.  Pruebas, enrique, por ejemplo:  Análisis de macie para verificar qué tan dashawn coagula long macie.  Kristin ecografía Doppler. Esta es la mejor forma de detectar kristin TVP.  Venograma por tomografía computarizada (TC). Se inyecta un tinte de contraste en kristin vena y se eileen radiografías para verificar si hay coágulos. Bird-in-Hand es útil para las venas del pecho o de la pelvis.  ¿Cómo se trata?  El tratamiento de esta afección depende de lo siguiente:  La causa de long TVP.  El tamaño y la ubicación de la TVP, o tener más de kristin TVP.  Long riesgo de tener kristin hemorragia y tener más coágulos.  Otras enfermedades que puede tener.  El tratamiento puede incluir:  Nayeli un medicamento que diluye la macie (anticoagulante) para evitar que se formen más coágulos o que los coágulos actuales aumenten de tamaño.  Usar medias de compresión.  Aplicarse inyecciones de medicamentos para romper el coágulo (trombólisis dirigida por catéter).  Procedimientos quirúrgicos cuando la TVP es grave o difícil de tratar. Estos se pueden realizar para:  Aislar y retirar el coágulo.  Colocar un filtro en la vena cava inferior (VCI). Evelin filtro se coloca en kristin vena willy que se llama vena cava inferior para capturar los coágulos de macie antes de que lleguen a los pulmones.  Puede recibir tratamientos médicos cyndi 6 meses o más tiempo.    Siga estas instrucciones en long casa:  Si está tomando anticoagulantes, tenga en cuenta lo siguiente:    Hable con el médico antes de nayeli cualquier medicamento que contenga aspirina o antiinflamatorios no esteroideos (EVERARDO), enrique el ibuprofeno. Estos medicamentos aumentan el riesgo de tener kristin hemorragia peligrosa.  Medill los medicamentos exactamente enrique se lo indicaron, todos los días a la misma hora. No se saltee kristin dosis. No tome más de la dosis recetada. Bird-in-Hand es importante.  Pregúntele a long médico sobre los alimentos y medicamentos que pueden cambiar la forma en que funciona el anticoagulante o interactuar con evelin. Evite estos alimentos y medicamentos si se lo indican.  Evite todo lo que pueda causar sangrados o moretones. Puede sangrar con más facilidad mientras jordon anticoagulantes.  Tenga sumo cuidado al usar cuchillos, tijeras u otros objetos filosos.  Aféitese con kristin rasuradora eléctrica en lugar de hacerlo con kristin hoja de afeitar.  Evite las actividades que podrían causarle lesiones o moretones y siga las instrucciones para evitar las caídas.  Informe al médico si ha tenido sangrado interno, úlceras sangrantes o enfermedades neurológicas, enrique accidentes cerebrovasculares o aneurismas cerebrales.  Use un brazalete de alerta médica o lleve kristin tarjeta con kristin lista de los medicamentos que jordon.  Instrucciones generales    Use los medicamentos de venta miguel y los recetados solamente enrique se lo haya indicado el médico.  Retome jaison actividades normales según lo indicado por el médico. Pregúntele al médico qué actividades son seguras para usted.  Si se lo recomienda el médico, use medias de compresión según jaison indicaciones. Estas medias ayudan a evitar la formación de coágulos de amcie y a reducir la hinchazón de las piernas. Nunca use medias de compresión para dormir por la noche.  Concurra a todas las visitas de seguimiento. Bird-in-Hand es importante.  Dónde buscar más información  American Heart Association (Asociación Estadounidense del [place]): www.heart.org  Centers for Disease Control and Prevention (Centros para el Control y la Prevención de Enfermedades): www.cdc.gov  National Heart, Lung, and Blood [place] (Instituto Nacional del [place], los Pulmones y la Macie): www.nhlbi.nih.gov  Comuníquese con un médico si:  Se saltea kristin dosis del anticoagulante.  Tiene moretones inusuales u otros cambios de color.  Tiene dolor, hinchazón o enrojecimiento nuevos en un brazo o kristin pierna o se produce un empeoramiento de alguno de estos síntomas.  Tiene entumecimiento u hormigueo que empeora en un brazo o kristin pierna.  Tiene un cambio significativo de color (palidez o color azulado) en la extremidad que tiene la TVP.  Solicite ayuda de inmediato si:  Tiene signos o síntomas de que un coágulo de macie se ha movido a los pulmones. Pueden incluir:  Falta de aire.  Dolor de pecho.  Latidos cardíacos acelerados o irregulares (palpitaciones).  Sensación de desvanecimiento, mareos o desmayos.  Tos con macie.  Tiene signos o síntomas de que la macie está demasiado anticoagulada. Pueden incluir:  Macie en el vómito, las heces o la orina.  Un laurel que no suzie de sangrar.  Período menstrual más abundante que lo normal.  Dolor de yuly intenso o confusión.  Estos síntomas pueden indicar kristin emergencia. Solicite ayuda de inmediato. Llame al 911.  No espere a regina si los síntomas desaparecen.  No conduzca por jaison propios medios hasta el hospital.  Resumen  La trombosis venosa profunda (TVP) sucede cuando se forma un coágulo de macie en kristin vena profunda. Bird-in-Hand puede ocurrir en la parte inferior de la pierna, el muslo, la pelvis, el brazo o el ting.  Los síntomas afectan el brazo o la pierna, y pueden incluir hinchazón, dolor, sensibilidad, calor, enrojecimiento o cambio de color.  Esta afección se puede tratar con medicamentos. En casos graves, se puede realizar un procedimiento o kristin cirugía para extirpar o disolver los coágulos.  Si está recibiendo anticoagulantes, tómelos exactamente enrique se lo hayan indicado. No se saltee kristin dosis. No tome más de lo recetado.  Obtenga ayuda de inmediato si tiene dolor de yuly intenso, le falta el aire, siente dolor en el pecho, tiene latidos cardíacos acelerados o irregulares, o ve macie en el vómito, la orina o las heces.  Esta información no tiene enrique fin reemplazar el consejo del médico. Asegúrese de hacerle al médico cualquier pregunta que tenga.

## 2025-04-27 NOTE — ED STATDOCS - OBJECTIVE STATEMENT
51 y/o F with no pertinent PMHx presents to the ED sent from  c/o LLE sharp pain onset x3 days ago. Pt denies injury or fall. Pt states the leg hurts on ambulation. Pt took tylenol yesterday. Pt denies chest pain, SOB. Pt denies recent travel.

## 2025-04-27 NOTE — ED STATDOCS - CLINICAL SUMMARY MEDICAL DECISION MAKING FREE TEXT BOX
41 y/o no med hx with atraumatic leg pain for 3 days. Pt denies any injury, chest pain, SOB. Pt took Tylenol yesterday. Pt reports pain with walking or movement. Pt went to  this morning sent to ED for further eval. Will do sono to rule out DVT, send labs, pain med, and re eval.

## 2025-04-27 NOTE — ED ADULT NURSE NOTE - NSFALLRISKINTERV_ED_ALL_ED
Assistance OOB with selected safe patient handling equipment if applicable/Assistance with ambulation/Communicate fall risk and risk factors to all staff, patient, and family/Monitor gait and stability/Provide visual cue: yellow wristband, yellow gown, etc/Reinforce activity limits and safety measures with patient and family/Call bell, personal items and telephone in reach/Instruct patient to call for assistance before getting out of bed/chair/stretcher/Non-slip footwear applied when patient is off stretcher/Lewis to call system/Physically safe environment - no spills, clutter or unnecessary equipment/Purposeful Proactive Rounding/Room/bathroom lighting operational, light cord in reach

## 2025-04-27 NOTE — ED STATDOCS - PATIENT PORTAL LINK FT
You can access the FollowMyHealth Patient Portal offered by [facility] by registering at the following website: [email or web address]. By joining [employer]’s FollowMyHealth portal, you will also be able to view your health information using other applications (apps) compatible with our system.

## 2025-04-27 NOTE — ED STATDOCS - ATTENDING APP SHARED VISIT CONTRIBUTION OF CARE
I,Karson Serna MD,  performed the initial face to face bedside interview with this patient regarding history of present illness, review of symptoms and relevant past medical, social and family history.  I completed an independent physical examination.  I was the initial provider who evaluated this patient. I have signed out the follow up of any pending tests (i.e. labs, radiological studies) to the ACP.  I have communicated the patient’s plan of care and disposition with the ACP.  The history, relevant review of systems, past medical and surgical history, medical decision making, and physical examination was documented by the scribe in my presence and I attest to the accuracy of the documentation.

## 2025-06-06 ENCOUNTER — APPOINTMENT (OUTPATIENT)
Dept: HEMATOLOGY ONCOLOGY | Facility: CLINIC | Age: 50
End: 2025-06-06

## 2025-07-26 ENCOUNTER — NON-APPOINTMENT (OUTPATIENT)
Age: 50
End: 2025-07-26